# Patient Record
Sex: FEMALE | URBAN - METROPOLITAN AREA
[De-identification: names, ages, dates, MRNs, and addresses within clinical notes are randomized per-mention and may not be internally consistent; named-entity substitution may affect disease eponyms.]

---

## 2023-09-21 ENCOUNTER — APPOINTMENT (RX ONLY)
Dept: URBAN - METROPOLITAN AREA CLINIC 55 | Facility: CLINIC | Age: 63
Setting detail: DERMATOLOGY
End: 2023-09-21

## 2023-09-21 DIAGNOSIS — Z41.9 ENCOUNTER FOR PROCEDURE FOR PURPOSES OTHER THAN REMEDYING HEALTH STATE, UNSPECIFIED: ICD-10-CM

## 2023-09-21 PROCEDURE — ? BOTOX

## 2023-09-21 PROCEDURE — ? INVENTORY

## 2023-09-21 PROCEDURE — ? COSMETIC CONSULTATION: BOTULINUM TOXIN

## 2023-09-21 NOTE — PROCEDURE: BOTOX
Show Ucl Units: No
[place] Units: 0
Forehead Units: 6
Show Additional Area 6: Yes
Detail Level: Detailed
Show Inventory Tab: Show
Glabellar Complex Units: 16
Consent obtained and risk reviewed.
Post-Care Instructions: Discussed not to lie down flat  or vigorously rub the treatment area for the next 4 hours .
Periorbital Skin Units: 12
Additional Area 1 Location: upper lip
Dilution (U/0.1 Cc): 4
Comments: Make up was removed from treatment area and then prepped with 70% isopropyl alcohol prior to injections.
Incrementing Botox Units: By 0.5 Units

## 2023-09-29 ENCOUNTER — APPOINTMENT (RX ONLY)
Dept: URBAN - METROPOLITAN AREA CLINIC 55 | Facility: CLINIC | Age: 63
Setting detail: DERMATOLOGY
End: 2023-09-29

## 2023-09-29 DIAGNOSIS — Z41.9 ENCOUNTER FOR PROCEDURE FOR PURPOSES OTHER THAN REMEDYING HEALTH STATE, UNSPECIFIED: ICD-10-CM

## 2023-09-29 PROCEDURE — ? BOTOX

## 2023-09-29 NOTE — PROCEDURE: BOTOX
Nasal Root Units: 0
Show Right And Left Periorbital Units: No
Detail Level: Detailed
Periorbital Skin Units: 6
Show Depressor Anguli Units: Yes
Incrementing Botox Units: By 0.5 Units
Show Inventory Tab: Show
Dilution (U/0.1 Cc): 4
Additional Area 1 Location: upper lip
Consent obtained and risk reviewed.
Comments: Make up was removed from treatment area and then prepped with 70% isopropyl alcohol prior to injections.
Post-Care Instructions: Discussed not to lie down flat  or vigorously rub the treatment area for the next 4 hours .